# Patient Record
(demographics unavailable — no encounter records)

---

## 2024-12-23 NOTE — HISTORY OF PRESENT ILLNESS
[FreeTextEntry1] : 38 yo  LMP 11/02/24 had scant staining sometime last wk, nothing since pt very nauseous, anxious about taking medicine to help that on synthroud 100 mcg. and taking one extra pill a wk as per endocrinologist

## 2024-12-23 NOTE — PHYSICAL EXAM
[Chaperone Present] : A chaperone was present in the examining room during all aspects of the physical examination [[id number]] : A chaperone was present during the pelvic exam. [Soft] : soft [Non-tender] : non-tender [Non-distended] : non-distended [No Mass] : no mass [Oriented x3] : oriented x3 [Labia Majora] : normal [Labia Minora] : normal [Normal] : normal [Uterine Adnexae] : normal

## 2024-12-23 NOTE — PROCEDURE
[Transvaginal OB Sonogram] : Transvaginal OB Sonogram [Intrauterine Pregnancy] : intrauterine pregnancy [Yolk Sac] : yolk sac present [Fetal Heart] : fetal heart present [CRL: ___ (mm)] : CRL - [unfilled]Umm [Current GA by Sonogram: ___ (wks)] : Current GA by Sonogram: [unfilled]Uwks [___ day(s)] : [unfilled] days [FreeTextEntry1] :  bpm,  EDC 08/11/25 by patricia

## 2024-12-23 NOTE — PLAN
[FreeTextEntry1] : sono today shows early viable pregnancy   EDC 08/11/24 by sono.  which agrees with dates prenatal bloods and TFTs drawn n/v 4 wks.